# Patient Record
(demographics unavailable — no encounter records)

---

## 2024-11-08 NOTE — HEALTH RISK ASSESSMENT
[No] : No [1 or 2 (0 pts)] : 1 or 2 (0 points) [Never (0 pts)] : Never (0 points) [0] : 2) Feeling down, depressed, or hopeless: Not at all (0) [PHQ-2 Negative - No further assessment needed] : PHQ-2 Negative - No further assessment needed [Never] : Never [de-identified] : primarily walking with baby - no one to watch baby [de-identified] : no bread, no rice, eating eggs, chicken, bee, vegetables broccoli zucchini

## 2024-11-08 NOTE — REVIEW OF SYSTEMS
[Hot Flashes] : hot flashes [Night Sweats] : night sweats [Fever] : no fever [Chills] : no chills [Fatigue] : no fatigue [Discharge] : no discharge [Vision Problems] : no vision problems [Hearing Loss] : no hearing loss [Chest Pain] : no chest pain [Palpitations] : no palpitations [Shortness Of Breath] : no shortness of breath [Abdominal Pain] : no abdominal pain [Dysuria] : no dysuria [Joint Pain] : no joint pain [Muscle Pain] : no muscle pain [Itching] : no itching [Skin Rash] : no skin rash [Headache] : no headache [Suicidal] : not suicidal [Insomnia] : no insomnia [Anxiety] : no anxiety [Depression] : no depression

## 2024-11-08 NOTE — HISTORY OF PRESENT ILLNESS
[FreeTextEntry1] : diabetes f/u [de-identified] : Patient presents for routine follow-up for HTN and DM. The patient has been having elevated blood sugar at home 170- 200 in the morning. BP at home usually around 130/80. No headaches, no dizziness, no changes in vision. She has occasional episodes of significant siddharth.  Interested in starting Monjaro after breast feeding and hoping to stop in December.  Reviewed current medications, and no adverse effects were noted. Labs/imaging from last visit were reviewed and discussed.

## 2024-11-11 NOTE — REVIEW OF SYSTEMS
[Fever] : no fever [Chills] : no chills [Fatigue] : no fatigue [Discharge] : no discharge [Vision Problems] : no vision problems [Hearing Loss] : no hearing loss [Chest Pain] : no chest pain [Palpitations] : no palpitations [Shortness Of Breath] : no shortness of breath [Abdominal Pain] : no abdominal pain [Dysuria] : no dysuria [Joint Pain] : no joint pain [Muscle Pain] : no muscle pain [Itching] : no itching [Skin Rash] : no skin rash [Headache] : no headache [Suicidal] : not suicidal [Insomnia] : no insomnia [Anxiety] : no anxiety [Depression] : no depression

## 2024-11-11 NOTE — HEALTH RISK ASSESSMENT
[de-identified] : primarily walking with baby - no one to watch baby [de-identified] : no bread, no rice, eating eggs, chicken, bee, vegetables broccoli zucchini

## 2024-11-11 NOTE — PLAN
[FreeTextEntry1] : #HTN c/w Metop Succ 100 mg QD c/w Nifedipine ER 60 mg QD  #DM POCT a1c was 8.3 c/w metformin 1000 mg BID  patient unable to start glp1  plans to stop breastfeeding in December seen by optho - no ocular complicaitons  #HLD  unable to resume statin due to breastfeeding

## 2024-11-11 NOTE — HISTORY OF PRESENT ILLNESS
[FreeTextEntry1] : diabetes f/u [de-identified] : Patient presents for routine follow-up for HTN and DM. The patient has been having elevated blood sugar at home 170- 200 in the morning. BP at home usually around 130/80. No headaches, no dizziness, no changes in vision. She has occasional episodes of significant siddharth.  Interested in starting Monjaro after breast feeding and hoping to stop in December.  Reviewed current medications, and no adverse effects were noted. Labs/imaging from last visit were reviewed and discussed.

## 2024-12-06 NOTE — HISTORY OF PRESENT ILLNESS
[FreeTextEntry1] : Patient is a 35y  s/p  24 with PMH cHTN and T2DM who presents to booking clinic for consultation for bilateral salpingectomy.  Patient states she has completed childbearing and has been on Depo-Provera q3 months since delivery for interim contraception.  Patient feels well today and denies complaints.   OBhx: FT  (),  @ 36w3d  siPEC(2024),  MAB s/p D/C  GYN: PCOS, denies hx of fibroids, abnormal paps, STI PMSH: cHTN, HLD, T2DM, Obesity MEDs: Metformin 1000mg BID, metoprolol 100mg qd, Nifedipine ER 60 mg QD,  ALL: NKDA

## 2024-12-06 NOTE — PLAN
[FreeTextEntry1] : Patient is a 35y  s/p  24 with PMH cHTN and T2DM who presents to booking clinic for consultation for bilateral salpingectomy.  Patient states she has completed childbearing and has been on Depo-Provera q3 months since delivery for interim contraception.    PLAN #Medical optimization - Patient to start Mounjaro now that she is done breastfeeding - Patient counseled on benefits of medical optimization of T2DM and HTN in reducing surgical risks  #Bilateral salpingectomy  - Will schedule patient procedure for early spring  discussed alternatives at length with pt, discussed vasectomy, discussed LARC option (understands almost equally affective) pt states she doesnt want another child, understands irreversible nature of procedure reviewed inc risk with procedure due to BMI/central obesity, DM pt needs medical optimization, then will return to clinic to sign forms  reviewed risks of surgery including but not limited to VTE, SSI, damage to bowel, bladder ureter thermal damage, need for laparotomy, transfusion will need to be in main OR due to BMI.

## 2024-12-06 NOTE — COUNSELING
[Nutrition/ Exercise/ Weight Management] : nutrition, exercise, weight management [Contraception/ Emergency Contraception/ Safe Sexual Practices] : contraception, emergency contraception, safe sexual practices [FreeTextEntry2] : Patient counseled on risks and benefits of IUD, Depo-Provera. Patient counseled on risks and benefits of bilateral sapingectomy. Patient informed that this procedure will permanently prevent preganncy. Patient counseled on risks of surgery associated with poorly controlled diabetes including poor wound healing and increaed risk of infection.

## 2024-12-06 NOTE — END OF VISIT
[] : Fellow [FreeTextEntry3] : I agree with the above assessment and plan. pt seen with MS3 and Fellow. all questions answered at length understands needs medical optimization prior to elective procedure (discussed A1C needs to come down, HTN well controlled, pt states starting GLP1 hoping for weight loss) will continue DEPO until medically optimized declining alternative contrapcetive options, declining vasectomy for  Destiny Koch MD

## 2025-01-02 NOTE — ASSESSMENT
[FreeTextEntry1] :  # Type 2 DM A1c 11/8/2024: 8.3%, markedly above goal. Patient would benefit significantly from starting GLP agonist. I discussed with the patient today that the plan will be to start a GLP agonist and uptitrate as tolerated monthly.  Her numbers are elevated enough that I would consider once daily long-acting insulin.  She is to let me know if her numbers do not start to come down within several weeks and we can initiate a low-dose of long-acting insulin.  Would like start patient on a GLP agonist. We discussed the pros and cons of this class of medications, including the benefits of glucose lowering,weight loss, cardiovascular and renal benefits and the warnings for pancreatitis, medullary thyroid cancer, and acute kidney injury. Patient is agreeable with starting a medication of this class. I counseled that patient that if severe abdominal pain and nausea and vomiting occurs, the pt should stop GLP and go to the ER. We also discussed the more mild side effect of abdominal discomfort/nausea with GLP  initiation, which should improve over time. The patient was instructed to let me know if the side effects are intolerable/do not improve.  Plan: Continue metformin  mg 2 tablets twice daily. Start Mounjaro 2.5 mg once weekly, uptitrate to 5 mg once weekly thereafter. Return to care with CDE in 2 months.  - No personal h/o pancreatitis or family h/o MTC/MEN syndrome. d/w starting Mounjaro once she is done breast feeding.  -  I had a lengthy discussion regarding healthy diet (consistent carbohydrates and low calorie content) with the patient. I also emphasized to maintain routine exercise activity (30 minutes daily or 150 minutes in the week). -  advised to check FS in a staggered manner for BG goal  and to bring BG readings and/or glucometer to all the appointments - advised on annual dilated eye exam and daily feet checks.  # HLD -  7/2024 - not on atorvastatin d/t breast feeding - goal LDL in diabetes mellitus is <70 - repeat at the next visit -Restart statin now that she is not breast-feeding.  Restart atorvastatin at 10 mg once daily.  Recheck lipids at next visit when the glucose control is better.  # HTN - BP at goal - ACR wnl 4/2024 - defer to primary team for management - Patient is presently on metoprolol 100 mg once daily, nifedipine 90 mg once daily  # morbid obesity - counseled on diet and exercise  My recommendation would be to start Mounjaro today, repeat labs at the next visit once the glycemic control has improved.  I spent over 30 minutes total time with this patient encounter including before, during and after time spent with patient in exam room. Time was spent carefully reviewing the chart in preparing to see patients and performing history & physical exam and ordering appropriate testing and documenting in the medial record.  Additional time was spent counseling the patient regarding my findings and recommendations, recent & current test results as available, any pertinent prior medical records available for review including prior visits, and patients' disease state including risk factor medication.

## 2025-01-16 NOTE — CHIEF COMPLAINT
[Follow Up] : follow up GYN visit [FreeTextEntry1] : i would like to sign sterilization consents today

## 2025-01-16 NOTE — COUNSELING
[Contraception] : contraception [Medication Management] : medication management [Pre/Post Op Instructions] : pre/post op instructions [Weight Management] : weight management

## 2025-01-23 NOTE — DISCUSSION/SUMMARY
[FreeTextEntry1] :  Patient returns for follow up.  Ms. Jones is a 35-year-old female carries a history of chronic hypertension (diagnosed 10 years ago), Type 2 diabetes, obesity (BMI 46.16) and PCOS s/p  # GA 36 weeks 24. PP course complicated due to sPEC. Presently Denies chest pain, shortness of breath, dizziness, lightheadedness, palpitations or near syncope or syncope, orthopnea, PND and increasing lower extremity edema.   CHTN / sPEC; BP Stable Continue Metop Succ 100 mg QD  c/w  Nifedipine ER 60 mg QD - she tried to decrease to 30 mg  but the BP was too high Encouraged Patient to monitor BP at home, keep a log, and report results back to us for evaluation. Based on the results, we will adjust medications as necessary. Additionally, encouraged a heart-healthy diet and exercise as tolerated. EKG with no acute changes.  # Exercise stress test to evaluate for functional status pending and scheduled 25  # HLD : 2024 - , , HDL 36,   started on the atorvastin on 25 and on the mounjaro - managed by endocrinologist Encouraged patient to continue healthy exercise and eating habits, focusing on a Mediterranean style of eating and aiming for the recommended 150 minutes per week of moderate physical activity. repeat lipids in 3 months and if elevated will consider Pravastatin ( since she is nursing )   Encouraged the patient to find healthy outlets and coping mechanisms to help manage stress, such as physical activity/exercise, reducing workload if possible, spending time with family and friends, engaging in an enjoyable hobby, or using meditation or mindfulness techniques.  Encouraged patient to continue healthy exercise and eating habits, focusing on a Mediterranean style of eating and aiming for the recommended 150 minutes per week of moderate physical activity. [EKG obtained to assist in diagnosis and management of assessed problem(s)] : EKG obtained to assist in diagnosis and management of assessed problem(s)

## 2025-01-23 NOTE — CARDIOLOGY SUMMARY
[de-identified] : 1/23/25  dyana [de-identified] : Left ventricular systolic function is normal with an ejection fraction of 65 % by visual interpretation visually estimated at 60 to 65 %. 2. Physiologic mitral regurgitation. 3. Physiologic tricuspid regurgitation.

## 2025-01-23 NOTE — HISTORY OF PRESENT ILLNESS
[FreeTextEntry1] :  Patient returns for follow up.  Ms. Jones is a 35-year-old female carries a history of chronic hypertension (diagnosed 10 years ago), Type 2 diabetes, obesity (BMI 46.16) and PCOS s/p  # GA 36 weeks 24. PP course complicated due to sPEC. Presently Denies chest pain, shortness of breath, dizziness, lightheadedness, palpitations or near syncope or syncope, orthopnea, PND and increasing lower extremity edema.   CHTN / sPEC; BP Stable Continue Metop Succ 100 mg QD  c/w  Nifedipine ER 60 mg QD - she tried to decrease to 30 mg  but the BP was too high Encouraged Patient to monitor BP at home, keep a log, and report results back to us for evaluation. Based on the results, we will adjust medications as necessary. Additionally, encouraged a heart-healthy diet and exercise as tolerated. EKG with no acute changes.  # Exercise stress test to evaluate for functional status pending and scheduled 25  # HLD : 2024 - , , HDL 36,   started on the atorvastin on 25 and on the mounjaro - managed by endocrinologist Encouraged patient to continue healthy exercise and eating habits, focusing on a Mediterranean style of eating and aiming for the recommended 150 minutes per week of moderate physical activity. repeat lipids in 3 months and if elevated will consider Pravastatin ( since she is nursing )   Encouraged the patient to find healthy outlets and coping mechanisms to help manage stress, such as physical activity/exercise, reducing workload if possible, spending time with family and friends, engaging in an enjoyable hobby, or using meditation or mindfulness techniques.  Encouraged patient to continue healthy exercise and eating habits, focusing on a Mediterranean style of eating and aiming for the recommended 150 minutes per week of moderate physical activity.      Meconium/Obstetrician’s Request

## 2025-03-05 NOTE — ASSESSMENT
[Patient Optimized for Surgery] : Patient optimized for surgery [No Further Testing Recommended] : no further testing recommended [As per surgery] : as per surgery [Modify medications prior to procedure] : Modify medications prior to procedure [FreeTextEntry4] : Samina Ocampo 36-year-old female pmhx of HTN, T2DM, obesity, and PCOS presenting for MCA for bilateral salpingectomy is evaluated to be a moderate risk for a moderate risk surgery. Patient is otherwise optimized for surgery, pending lab work. [FreeTextEntry7] : See plan below

## 2025-03-05 NOTE — PLAN
[FreeTextEntry1] : #Wale-operative, medication management - Recommend no NSAIDs within 48hrs of surgery - Tylenol PRN for pain or HA  #T2DM Labs: A1c & CMP pending - Recommend holding Metformin 1000mg BID day of surgery - Recommend administering Mounjaro 5mg not on day of surgery; Encouraged to take med a different day during the week ( her last regular scheduled dose will be 1 week prior to surgery) - Further clarification concerning wale-operative DM medications can be directed to Endocrinology  #HLD - Continue Atorvastatin 10mg    #HTN BP today: 120/80 - Continue Metoprolol 100mg on day of surgery - Continue Nifedipine ER 60mg on day of surgery  RTC in 3 months for CPE  Patient evaluated and case discussed with Dr. Heriberto Prasad PGY-1

## 2025-03-05 NOTE — PHYSICAL EXAM
[Pedal Pulses Present] : the pedal pulses are present [No Edema] : there was no peripheral edema [Normal Posterior Cervical Nodes] : no posterior cervical lymphadenopathy [Normal Anterior Cervical Nodes] : no anterior cervical lymphadenopathy [Normal] : affect was normal and insight and judgment were intact

## 2025-03-05 NOTE — HISTORY OF PRESENT ILLNESS
[No Pertinent Cardiac History] : no history of aortic stenosis, atrial fibrillation, coronary artery disease, recent myocardial infarction, or implantable device/pacemaker [No Pertinent Pulmonary History] : no history of asthma, COPD, sleep apnea, or smoking [No Adverse Anesthesia Reaction] : no adverse anesthesia reaction in self or family member [Diabetes] : diabetes [(Patient denies any chest pain, claudication, dyspnea on exertion, orthopnea, palpitations or syncope)] : Patient denies any chest pain, claudication, dyspnea on exertion, orthopnea, palpitations or syncope [Good (7-10 METs)] : Good (7-10 METs) [Chronic Anticoagulation] : no chronic anticoagulation [Chronic Kidney Disease] : no chronic kidney disease [FreeTextEntry2] : 03/20/2025 [FreeTextEntry1] : bilateral salpingectomy [FreeTextEntry3] : Dr. Koch [FreeTextEntry4] : Samina Ocampo 36-year-old female pmhx of HTN, T2DM, obesity, and PCOS presenting for MCA for bilateral salpingectomy.    - No acute issues; recovery well from  9 months ago. Sleeping 2-6 hours per night - Complaint with medications; No other vitamins, supplements, or OTC NSAIDs or pain relievers - Home BPs: 120-130s/80s  - Home Bs-180s  - Denies any recent illness, CP, SOB, N/V, diarrhea  Screener for RONDA: STOP-BANG: 3 points (High risk for moderate to severe RONDA) Modified mallampati classification: Class II-III  [FreeTextEntry7] : Cardiac Stress test:  1. The ECG is negative for ischemia.  2. Patient achieved 9 METS, which is consistent with fair exercise capacity.  3. The Duke Treadmill Score is 7.00, which is consistent with low risk (=5) for future cardiac events.

## 2025-03-05 NOTE — END OF VISIT
[] : Resident [FreeTextEntry3] : all question answered. pt verbalizes understanding of instructions re meds check gluocse control

## 2025-04-29 NOTE — CARDIOLOGY SUMMARY
[de-identified] : 4/24/25 nsr  nsr [de-identified] : Left ventricular systolic function is normal with an ejection fraction of 65 % by visual interpretation visually estimated at 60 to 65 %. 2. Physiologic mitral regurgitation. 3. Physiologic tricuspid regurgitation.

## 2025-04-29 NOTE — CARDIOLOGY SUMMARY
[de-identified] : 4/24/25 nsr  nsr [de-identified] : Left ventricular systolic function is normal with an ejection fraction of 65 % by visual interpretation visually estimated at 60 to 65 %. 2. Physiologic mitral regurgitation. 3. Physiologic tricuspid regurgitation.

## 2025-04-29 NOTE — CARDIOLOGY SUMMARY
[de-identified] : 4/24/25 nsr  nsr [de-identified] : Left ventricular systolic function is normal with an ejection fraction of 65 % by visual interpretation visually estimated at 60 to 65 %. 2. Physiologic mitral regurgitation. 3. Physiologic tricuspid regurgitation.

## 2025-04-29 NOTE — DISCUSSION/SUMMARY
[FreeTextEntry1] :  Patient returns for follow up.  Ms. Jones is a 36-year-old female carries a history of chronic hypertension (diagnosed 10 years ago), Type 2 diabetes, obesity (BMI 46.16) and PCOS s/p  # GA 36 weeks 24. PP course complicated due to sPEC. Presently Denies chest pain, shortness of breath, dizziness, lightheadedness, palpitations or near syncope or syncope, orthopnea, PND and increasing lower extremity edema.   CHTN / sPEC; BP Stable Continue Metop Succ 100 mg QD  c/w  Nifedipine ER 60 mg QD - she tried to decrease to 30 mg  but the BP was too high Encouraged Patient to monitor BP at home, keep a log, and report results back to us for evaluation. Based on the results, we will adjust medications as necessary. Additionally, encouraged a heart-healthy diet and exercise as tolerated. EKG with no acute changes.  # Exercise stress test to evaluate for functional status pending and scheduled 25  # HLD : 2024 - , , HDL 36,   started on the atorvastin on 25 and on the mounjaro - managed by endocrinologist Encouraged patient to continue healthy exercise and eating habits, focusing on a Mediterranean style of eating and aiming for the recommended 150 minutes per week of moderate physical activity. repeat lipids in 3 months and if elevated will consider Pravastatin ( since she is nursing )   Encouraged the patient to find healthy outlets and coping mechanisms to help manage stress, such as physical activity/exercise, reducing workload if possible, spending time with family and friends, engaging in an enjoyable hobby, or using meditation or mindfulness techniques.  Encouraged patient to continue healthy exercise and eating habits, focusing on a Mediterranean style of eating and aiming for the recommended 150 minutes per week of moderate physical activity. [EKG obtained to assist in diagnosis and management of assessed problem(s)] : EKG obtained to assist in diagnosis and management of assessed problem(s)

## 2025-04-29 NOTE — HISTORY OF PRESENT ILLNESS
[FreeTextEntry1] :  Patient returns for follow up.  Ms. Jones is a 36-year-old female carries a history of chronic hypertension (diagnosed 10 years ago), Type 2 diabetes, obesity (BMI 46.16) and PCOS s/p  # GA 36 weeks 24. PP course complicated due to sPEC. Presently Denies chest pain, shortness of breath, dizziness, lightheadedness, palpitations or near syncope or syncope, orthopnea, PND and increasing lower extremity edema.   CHTN / sPEC; BP Stable Continue Metop Succ 100 mg QD  c/w  Nifedipine ER 60 mg QD - she tried to decrease to 30 mg  but the BP was too high Encouraged Patient to monitor BP at home, keep a log, and report results back to us for evaluation. Based on the results, we will adjust medications as necessary. Additionally, encouraged a heart-healthy diet and exercise as tolerated. EKG with no acute changes.  # Exercise stress test to evaluate for functional status pending and scheduled 25  # HLD : 2024 - , , HDL 36,   started on the atorvastin on 25 and on the mounjaro - managed by endocrinologist Encouraged patient to continue healthy exercise and eating habits, focusing on a Mediterranean style of eating and aiming for the recommended 150 minutes per week of moderate physical activity. repeat lipids in 3 months and if elevated will consider Pravastatin ( since she is nursing )   Encouraged the patient to find healthy outlets and coping mechanisms to help manage stress, such as physical activity/exercise, reducing workload if possible, spending time with family and friends, engaging in an enjoyable hobby, or using meditation or mindfulness techniques.  Encouraged patient to continue healthy exercise and eating habits, focusing on a Mediterranean style of eating and aiming for the recommended 150 minutes per week of moderate physical activity.

## 2025-06-03 NOTE — ASSESSMENT
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 Previously trialed taking off metoprolol, but was having palpitations   PLAN > stop nifedipine 60 mg  > start lisinopril 10mg, daily BP check if BP getting elevated then patient will increase to lisinopril 20mg qd > f/u in 5 weeks via telehealth  > f/u in 3 months in person   #HLD  c/w atorvastatin 10 mg    #DM On Monjauro 5 mg and plans to go up to 7.5 mg this week; metformin 1000 BID; fargixa 5mg Has not noticed significant weight loss on Monjauro 5mg Fasting glucose down to 190s from 300s  PLAN > Educate to watch out for risk of BMI  > Will f/u labs in 1 month for endo and cardiology > f/u a1c, cbc, cmp, tsh, lipid profile, albumin creatine ratio > Educated on fargixa increased risk of UTI and how to minimize chance of UTI  #Hair loss #PCOS Likely iso of post partum hair loss started 6 months after delivery of baby of PCOS  PLAN > patient prefers less oral medication offered oral minoxidil, but will trial rogain and biotin vitamins

## 2025-06-03 NOTE — REVIEW OF SYSTEMS
[Abdominal Pain] : abdominal pain [Nausea] : nausea [Diarrhea] : diarrhea [Headache] : headache [Fever] : no fever [Chills] : no chills [Night Sweats] : no night sweats [Discharge] : no discharge [Vision Problems] : no vision problems [Earache] : no earache [Nasal Discharge] : no nasal discharge [Chest Pain] : no chest pain [Palpitations] : no palpitations [Orthopnea] : no orthopnea [Shortness Of Breath] : no shortness of breath [Itching] : no itching [Skin Rash] : no skin rash [FreeTextEntry7] : GI symptoms related to GLP injection [FreeTextEntry9] : Knee pain  [de-identified] : Headaches when she doesn't sleep because of baby

## 2025-06-03 NOTE — HISTORY OF PRESENT ILLNESS
[FreeTextEntry1] : d/u DM [de-identified] : 36 year female with Hx as below, including T2DM, chronic HTN, and PCOS presenting for  Interval history:  Since her last visit, she has been following with endo, obgyn, and cardiology. Given rising a1c (9.8 03/05/2025) she has been taking metformin 500 BID, moujaro 7.5 weekly, and farxiga 5mg.    #DM Patient reports that she has significant side effects from Monjauro. She has been having signficant nausea and diarrhea on the day after she injects GLP. It has been getting better, but dose increased to 7.5 mg due to minimal weight loss. She reports that blood sugars are much improved. AM fasting sugars were in the 300s at home and are now consistently ~190. She takes tums and Pepto-Bismol to control symptoms.   #hair loss Has noticed that 6 months ago she has been having a lot of hair loss. She had a baby one year ago. She had hair loss in the past and tried rogain. She has not tried any interventions since. She reports that she always has stray hairs on the chin that she pulls out. No other hair growth or hair loss anywhere else.   #birth control  Patient is planning on getting bilateral salpingectomy for birth control. She has PCOS and has long history of inconsistent period. Has not had a period since April.  She was not able to get surgery because fasting sugars were too high and needs to be better controlled before surgery.  #Gum swelling  Patient went to dentist for cleaning and had signficant gum swelling. Cleaning was very painful. Currently takes nifedipine 60mg daily.   Interim labs/imaging since last visit were reviewed and discussed.

## 2025-06-03 NOTE — HISTORY OF PRESENT ILLNESS
[FreeTextEntry1] : d/u DM [de-identified] : 36 year female with Hx as below, including T2DM, chronic HTN, and PCOS presenting for  Interval history:  Since her last visit, she has been following with endo, obgyn, and cardiology. Given rising a1c (9.8 03/05/2025) she has been taking metformin 500 BID, moujaro 7.5 weekly, and farxiga 5mg.    #DM Patient reports that she has significant side effects from Monjauro. She has been having signficant nausea and diarrhea on the day after she injects GLP. It has been getting better, but dose increased to 7.5 mg due to minimal weight loss. She reports that blood sugars are much improved. AM fasting sugars were in the 300s at home and are now consistently ~190. She takes tums and Pepto-Bismol to control symptoms.   #hair loss Has noticed that 6 months ago she has been having a lot of hair loss. She had a baby one year ago. She had hair loss in the past and tried rogain. She has not tried any interventions since. She reports that she always has stray hairs on the chin that she pulls out. No other hair growth or hair loss anywhere else.   #birth control  Patient is planning on getting bilateral salpingectomy for birth control. She has PCOS and has long history of inconsistent period. Has not had a period since April.  She was not able to get surgery because fasting sugars were too high and needs to be better controlled before surgery.  #Gum swelling  Patient went to dentist for cleaning and had signficant gum swelling. Cleaning was very painful. Currently takes nifedipine 60mg daily.   Interim labs/imaging since last visit were reviewed and discussed.

## 2025-06-03 NOTE — REVIEW OF SYSTEMS
[Abdominal Pain] : abdominal pain [Nausea] : nausea [Diarrhea] : diarrhea [Headache] : headache [Fever] : no fever [Chills] : no chills [Night Sweats] : no night sweats [Discharge] : no discharge [Vision Problems] : no vision problems [Earache] : no earache [Nasal Discharge] : no nasal discharge [Chest Pain] : no chest pain [Palpitations] : no palpitations [Orthopnea] : no orthopnea [Shortness Of Breath] : no shortness of breath [Itching] : no itching [Skin Rash] : no skin rash [FreeTextEntry7] : GI symptoms related to GLP injection [FreeTextEntry9] : Knee pain  [de-identified] : Headaches when she doesn't sleep because of baby

## 2025-07-10 NOTE — HISTORY OF PRESENT ILLNESS
[Home] : at home, [unfilled] , at the time of the visit. [Medical Office: ([facility])___] : at the medical office located in  [Telehealth (audio & video)] : This visit was provided via telehealth using real-time 2-way audio visual technology. [Verbal consent obtained from patient] : the patient, [unfilled] [FreeTextEntry1] : f/u BP check [de-identified] : Ms. ASHLEY RHOADES is a 36 year female with PMHx as listed below including T2DM, chronic HTN, elevated BMI, and PCOS following up for BP check given change in HTN medication.   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 At the last appointment patient was switched from nifedipine 30mg to lisinopril 10mg qd. She patient prepared a log of blood pressure values for appointment today. Most BP values are SBP 120s, but she had some SBP values 130s and 140. She reports that the higher values are when she had not yet taken her medication for the day. She usually takes meds around noon.   #weight loss She is reporting that she is also have that she is having difficulty tolerating Mounjaro. She has been on Mounjaro 7.5mg weekly for 6 weeks and she still has diarrhea 7 times a day and it is usually worse at night. She noticed that by day 6 after GLP injection the diarrhea usually starts to get better but she then does the injection again.  She is also taking metformin 1000 BID, which has caused her stomach upset before as well. Patient reports that her fasting glucose used to be in the 300s but is now in the 180s on Mounjaro

## 2025-07-10 NOTE — HISTORY OF PRESENT ILLNESS
[Home] : at home, [unfilled] , at the time of the visit. [Medical Office: ([facility])___] : at the medical office located in  [Telehealth (audio & video)] : This visit was provided via telehealth using real-time 2-way audio visual technology. [Verbal consent obtained from patient] : the patient, [unfilled] [FreeTextEntry1] : f/u BP check [de-identified] : Ms. ASHLEY RHOADES is a 36 year female with PMHx as listed below including T2DM, chronic HTN, elevated BMI, and PCOS following up for BP check given change in HTN medication.   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 At the last appointment patient was switched from nifedipine 30mg to lisinopril 10mg qd. She patient prepared a log of blood pressure values for appointment today. Most BP values are SBP 120s, but she had some SBP values 130s and 140. She reports that the higher values are when she had not yet taken her medication for the day. She usually takes meds around noon.   #weight loss She is reporting that she is also have that she is having difficulty tolerating Mounjaro. She has been on Mounjaro 7.5mg weekly for 6 weeks and she still has diarrhea 7 times a day and it is usually worse at night. She noticed that by day 6 after GLP injection the diarrhea usually starts to get better but she then does the injection again.  She is also taking metformin 1000 BID, which has caused her stomach upset before as well. Patient reports that her fasting glucose used to be in the 300s but is now in the 180s on Mounjaro

## 2025-07-10 NOTE — ASSESSMENT
[FreeTextEntry1] : #HTN Patient appears to be tolerating the transition to lisinopril well   PLAN > c/w lisinopril 10 mg qd > patient instructed to continue to keep a log and measure BP when she wakes up and in the evening  > If BP elevated at times throughout the day but better controlled after lisinopril will consider either lisinopril 10 mg BID for better coverage throughout the day (half life of lisinopril ~12 hr) v olmasartan for slightly longer half life  #weight loss #DM The patient reports no meaningful weight loss on Mounjaro 7.5 mg but does note appetite suppression and improved glycemic control (fasting glucose now ~180 mg/dL vs >300 mg/dL). She is experiencing significant diarrhea while on the combination of Mounjaro 7.5 mg and metformin 1,000 mg BID. Because she previously had GI upset on metformin alone, it remains unclear whether current symptoms are due to Mounjaro, metformin, or the combination.  PLAN >Hold Mounjaro for one week to assess whether diarrhea improves off the medication. Follow up telehealth appt in 1 week >c/w metformin 1000 BID >Follow-up: Endocrinology appointment scheduled for 08/14/25. >Pending clinical response, consider:  1) Trial of an alternative GLP-1 (e.g., Wegovy) if Mounjaro intolerance persists 2) Transition to a different oral regimen or bedtime insulin if GI symptoms are attributed to metformin plus Mounjaro.

## 2025-07-10 NOTE — REVIEW OF SYSTEMS
[Fever] : no fever [Chills] : no chills [Night Sweats] : no night sweats [Chest Pain] : no chest pain [Palpitations] : no palpitations [Orthopnea] : no orthopnea [Shortness Of Breath] : no shortness of breath [Abdominal Pain] : abdominal pain [Diarrhea] : diarrhea [Vomiting] : no vomiting

## 2025-07-20 NOTE — HISTORY OF PRESENT ILLNESS
[Home] : at home, [unfilled] , at the time of the visit. [Telephone (audio)] : This telephonic visit was provided via audio only technology. [Medical Office: ([facility])___] : at the medical office located in  [Technical] : patient unable to effectively utilize tele-video due to technical issues. [Verbal consent obtained from patient] : the patient, [unfilled] [FreeTextEntry1] : RPA [de-identified] : 36 year female with PMHx of T2DM, chronic HTN, elevated BMI, and PCOS presents for f/u on BP and DM. She takes lisinopril 10 mg qd around noon, and usually measures BP in the AM, which ranges 130s to 140s/80-100s. Her AM fasting FS has been 180s. She had no more diarrhea since stopping Mounrajo 7.5 mg and is now even a bit constipated, having BM every 2-3 days. She takes metformin 1000 mg BID and has not been able to get Farxiga due to requiring prior-authorization.

## 2025-07-20 NOTE — REVIEW OF SYSTEMS
[Constipation] : constipation [Fever] : no fever [Chills] : no chills [Chest Pain] : no chest pain [Palpitations] : no palpitations [Lower Ext Edema] : no lower extremity edema [Shortness Of Breath] : no shortness of breath [Wheezing] : no wheezing [Cough] : no cough [Abdominal Pain] : no abdominal pain [Nausea] : no nausea [Diarrhea] : diarrhea [Vomiting] : no vomiting

## 2025-07-20 NOTE — END OF VISIT
[] : Resident [FreeTextEntry3] :  20 minutes of total time was spent on the date of the encounter. This included time for review of medical records and laboratory results, face to face time (including the physical examination counseling and coordination of care), time for patient education, treatment plans, answering questions, communicating with other doctors and for medical record documentation.  The time spent is separate from time spent on separately billed procedures.       [Time Spent: ___ minutes] : I have spent [unfilled] minutes of time on the encounter which excludes teaching and separately reported services.

## 2025-07-20 NOTE — END OF VISIT
[] : Resident [FreeTextEntry3] :  20 minutes of total time was spent on the date of the encounter. This included time for review of medical records and laboratory results, face to face time (including the physical examination counseling and coordination of care), time for patient education, treatment plans, answering questions, communicating with other doctors and for medical record documentation.  The time spent is separate from time spent on separately billed procedures.       [Time Spent: ___ minutes] : I have spent [unfilled] minutes of time on the encounter which excludes teaching and separately reported services. No

## 2025-07-20 NOTE — ASSESSMENT
[FreeTextEntry1] : 36 year female with PMHx of T2DM, chronic HTN, elevated BMI, and PCOS presents for f/u on BP and DM.   #HTN  - increase lisinopril to 20 mg qd  - advise pt to continue monitor BP daily at home and keeps a log to bring in at f/u visit  - RTC in 2 wks to f/u on BP  - check BMP prior to next visit (already ordered)   #DM  - Pt's diarrhea seems to be contributed to Mounjaro and unrelated to metformin, continue holding Mounjaro - c/w Metformin 1000 mg BID  - RTC in 2 wks for DM f/u  - check A1C prior to next visit (already ordered)  - re-sent Farxiga 10 mg qd to [facility] and tasked [facility] to assist with prior authorization, please give pt a call when prior-authorization is approved   Case d/w Dr. Guidry